# Patient Record
Sex: FEMALE | Race: WHITE | NOT HISPANIC OR LATINO | Employment: FULL TIME | URBAN - METROPOLITAN AREA
[De-identification: names, ages, dates, MRNs, and addresses within clinical notes are randomized per-mention and may not be internally consistent; named-entity substitution may affect disease eponyms.]

---

## 2023-04-13 DIAGNOSIS — J32.1 FRONTAL SINUSITIS, UNSPECIFIED CHRONICITY: Primary | ICD-10-CM

## 2023-04-13 RX ORDER — AZITHROMYCIN 250 MG/1
250 TABLET, FILM COATED ORAL DAILY
Qty: 6 TABLET | Refills: 0 | Status: SHIPPED | OUTPATIENT
Start: 2023-04-13 | End: 2023-04-18

## 2023-04-20 DIAGNOSIS — J40 BRONCHITIS: Primary | ICD-10-CM

## 2023-04-20 RX ORDER — METHYLPREDNISOLONE 4 MG/1
TABLET ORAL
Qty: 21 TABLET | Refills: 0 | Status: SHIPPED | OUTPATIENT
Start: 2023-04-20 | End: 2023-04-27

## 2023-07-10 ENCOUNTER — TELEMEDICINE (OUTPATIENT)
Dept: PRIMARY CARE | Facility: CLINIC | Age: 63
End: 2023-07-10
Payer: COMMERCIAL

## 2023-07-10 VITALS — SYSTOLIC BLOOD PRESSURE: 145 MMHG | DIASTOLIC BLOOD PRESSURE: 95 MMHG | WEIGHT: 143 LBS

## 2023-07-10 DIAGNOSIS — I10 HYPERTENSION, UNSPECIFIED TYPE: ICD-10-CM

## 2023-07-10 DIAGNOSIS — F41.9 ANXIETY: ICD-10-CM

## 2023-07-10 DIAGNOSIS — I10 HYPERTENSION, UNSPECIFIED TYPE: Primary | ICD-10-CM

## 2023-07-10 PROBLEM — N39.0 ACUTE UTI: Status: ACTIVE | Noted: 2023-07-10

## 2023-07-10 PROBLEM — B37.0 THRUSH: Status: ACTIVE | Noted: 2023-07-10

## 2023-07-10 PROBLEM — J40 BRONCHITIS: Status: ACTIVE | Noted: 2023-07-10

## 2023-07-10 PROCEDURE — 99214 OFFICE O/P EST MOD 30 MIN: CPT | Performed by: INTERNAL MEDICINE

## 2023-07-10 RX ORDER — METOPROLOL SUCCINATE 100 MG/1
100 TABLET, EXTENDED RELEASE ORAL DAILY
Qty: 30 TABLET | Refills: 11 | Status: SHIPPED | OUTPATIENT
Start: 2023-07-10 | End: 2023-07-10

## 2023-07-10 RX ORDER — METOPROLOL SUCCINATE 100 MG/1
100 TABLET, EXTENDED RELEASE ORAL DAILY
Qty: 30 TABLET | Refills: 11 | Status: SHIPPED | OUTPATIENT
Start: 2023-07-10

## 2023-07-10 RX ORDER — METOPROLOL SUCCINATE 100 MG/1
TABLET, EXTENDED RELEASE ORAL
Qty: 30 TABLET | Refills: 11 | Status: SHIPPED | OUTPATIENT
Start: 2023-07-10 | End: 2023-07-10 | Stop reason: SDUPTHER

## 2023-07-10 RX ORDER — METOPROLOL SUCCINATE 50 MG/1
50 TABLET, EXTENDED RELEASE ORAL DAILY
Qty: 30 TABLET | Refills: 11 | Status: SHIPPED | OUTPATIENT
Start: 2023-07-10 | End: 2023-08-01

## 2023-07-10 RX ORDER — CITALOPRAM 10 MG/1
10 TABLET ORAL DAILY
Qty: 30 TABLET | Refills: 5 | Status: SHIPPED | OUTPATIENT
Start: 2023-07-10 | End: 2023-08-01

## 2023-07-10 RX ORDER — METOPROLOL SUCCINATE 50 MG/1
50 TABLET, EXTENDED RELEASE ORAL DAILY
Qty: 30 TABLET | Refills: 5 | Status: SHIPPED | OUTPATIENT
Start: 2023-07-10 | End: 2023-07-10 | Stop reason: SDUPTHER

## 2023-07-10 RX ORDER — HYDROXYZINE HYDROCHLORIDE 25 MG/1
25 TABLET, FILM COATED ORAL DAILY PRN
Qty: 30 TABLET | Refills: 3 | Status: SHIPPED | OUTPATIENT
Start: 2023-07-10 | End: 2023-08-04

## 2023-07-10 ASSESSMENT — PATIENT HEALTH QUESTIONNAIRE - PHQ9
10. IF YOU CHECKED OFF ANY PROBLEMS, HOW DIFFICULT HAVE THESE PROBLEMS MADE IT FOR YOU TO DO YOUR WORK, TAKE CARE OF THINGS AT HOME, OR GET ALONG WITH OTHER PEOPLE: SOMEWHAT DIFFICULT
SUM OF ALL RESPONSES TO PHQ9 QUESTIONS 1 AND 2: 1
1. LITTLE INTEREST OR PLEASURE IN DOING THINGS: NOT AT ALL
2. FEELING DOWN, DEPRESSED OR HOPELESS: SEVERAL DAYS

## 2023-07-10 NOTE — PROGRESS NOTES
"Subjective   Patient ID: Laura Logan is a 63 y.o. female who presents for Follow-up (Discuss medications and anxiety).  HPI  Anxiety worse since covid  Does not want to go places   Does not like to drive  Works at the school  Some palpitations  Saw dr smith prev  Sees dr beaver for vertigo  Does not like take meds  Has not tried celexa  Had stress test, echo EKG w cardiology w dr smith  All nl per pt  Anxiety makes her feel down  No si  Pulse 70-80  Wont get jeromy bc afraid it will be abnl      Review of Systems  Gen:  no fever  HEENT:  no trouble swallowing  CV:  no dyspnea, cyanosis  Lungs:  no shortness of breath  GI:  no constipation, no blood in stool  Vascular:  no edema  Neuro:   no weakness  Skin:  no rash  MS:no joint swelling  Gu:  no urinary complaints  All other systems have been reviewed and are negative for complaint    BP (!) 145/95   Wt 64.9 kg (143 lb)   Objective   Physical Exam  No results found for: \"WBC\", \"HGB\", \"HCT\", \"MCV\", \"PLT\"  No results found for: \"GLUCOSE\", \"CALCIUM\", \"NA\", \"K\", \"CO2\", \"CL\", \"BUN\", \"CREATININE\"  Social History     Socioeconomic History    Marital status: Single     Spouse name: None    Number of children: None    Years of education: None    Highest education level: None   Occupational History    None   Tobacco Use    Smoking status: Never    Smokeless tobacco: Never   Substance and Sexual Activity    Alcohol use: Yes    Drug use: None    Sexual activity: None   Other Topics Concern    None   Social History Narrative    None     Social Determinants of Health     Financial Resource Strain: Not on file   Food Insecurity: Not on file   Transportation Needs: Not on file   Physical Activity: Not on file   Stress: Not on file   Social Connections: Not on file   Intimate Partner Violence: Not on file   Housing Stability: Not on file     No family history on file.      General Appearance:  Alert and oriented.  NAD  Lungs, CTAB  Skin:  no suspicious lesions,  warm and dry  Head :  " Normocephalic  Neck/thyroid:  neck supple, full rom, no cervical lymphadenopathy  no thyromegaly  Heart:  RRR  no murmurs  Abdomen:  Normal , bs present, soft, nontender, not distended, no masses palpated  Extremities:  No clubbing, cyanosis, or edema  Neurologic:  Nonfocal  Psych: alert, normal mood    Problem List Items Addressed This Visit    None  Visit Diagnoses       Hypertension, unspecified type    -  Primary    Relevant Medications    metoprolol succinate XL (Toprol-XL) 50 mg 24 hr tablet    Toprol  mg 24 hr tablet    Other Relevant Orders    Comprehensive Metabolic Panel    Hemoglobin A1C    TSH with reflex to Free T4 if abnormal    Vitamin D, Total    Vitamin B12    Lipid Panel    CBC and Auto Differential    Magnesium    Iron and TIBC    Ferritin    Anxiety        Relevant Medications    metoprolol succinate XL (Toprol-XL) 50 mg 24 hr tablet    hydrOXYzine HCL (Atarax) 25 mg tablet    citalopram (CeleXA) 10 mg tablet    Other Relevant Orders    Comprehensive Metabolic Panel    Hemoglobin A1C    TSH with reflex to Free T4 if abnormal    Vitamin D, Total    Vitamin B12    Lipid Panel    CBC and Auto Differential    Magnesium    Iron and TIBC    Ferritin            Laura was seen today for follow-up.  Diagnoses and all orders for this visit:  Hypertension, unspecified type (Primary)  -     Comprehensive Metabolic Panel; Future  -     Hemoglobin A1C; Future  -     TSH with reflex to Free T4 if abnormal; Future  -     Vitamin D, Total; Future  -     Vitamin B12; Future  -     Lipid Panel; Future  -     CBC and Auto Differential; Future  -     Magnesium; Future  -     Iron and TIBC; Future  -     Ferritin; Future  -     metoprolol succinate XL (Toprol-XL) 50 mg 24 hr tablet; Take 1 tablet (50 mg) by mouth once daily. Do not crush or chew.  -     Toprol  mg 24 hr tablet; Take 1 tablet (100 mg) by mouth once daily.  Anxiety  -     Comprehensive Metabolic Panel; Future  -     Hemoglobin A1C; Future  -      TSH with reflex to Free T4 if abnormal; Future  -     Vitamin D, Total; Future  -     Vitamin B12; Future  -     Lipid Panel; Future  -     CBC and Auto Differential; Future  -     Magnesium; Future  -     Iron and TIBC; Future  -     Ferritin; Future  -     metoprolol succinate XL (Toprol-XL) 50 mg 24 hr tablet; Take 1 tablet (50 mg) by mouth once daily. Do not crush or chew.  -     hydrOXYzine HCL (Atarax) 25 mg tablet; Take 1 tablet (25 mg) by mouth once daily as needed for itching.  -     citalopram (CeleXA) 10 mg tablet; Take 1 tablet (10 mg) by mouth once daily.  Discussed EKG, would like to have  Pt may stop in on nurse sched to do   An interactive audio and video telecommunication system which permits real time communications between the patient (at the originating site) and provider (at the distant site) was utilized to provide this telehealth service.  Verbal consent was requested and obtained from the patient for this telehealth visit.

## 2023-07-12 DIAGNOSIS — M25.559 HIP PAIN, UNSPECIFIED LATERALITY: Primary | ICD-10-CM

## 2023-07-12 RX ORDER — METHYLPREDNISOLONE 4 MG/1
TABLET ORAL
Qty: 21 TABLET | Refills: 0 | Status: SHIPPED | OUTPATIENT
Start: 2023-07-12 | End: 2023-07-19

## 2023-07-19 ENCOUNTER — TELEPHONE (OUTPATIENT)
Dept: PRIMARY CARE | Facility: CLINIC | Age: 63
End: 2023-07-19
Payer: COMMERCIAL

## 2023-07-19 NOTE — TELEPHONE ENCOUNTER
PA was denied for Toprol XL.  Adverse reaction determination fax was given to pcp for recommendation.

## 2023-07-25 NOTE — TELEPHONE ENCOUNTER
Spoke with patient  She pays with [facility] and does not need the PA  She does not want to change to generic at this time

## 2023-08-01 DIAGNOSIS — I10 HYPERTENSION, UNSPECIFIED TYPE: ICD-10-CM

## 2023-08-01 DIAGNOSIS — F41.9 ANXIETY: ICD-10-CM

## 2023-08-01 RX ORDER — CITALOPRAM 10 MG/1
10 TABLET ORAL DAILY
Qty: 30 TABLET | Refills: 5 | Status: SHIPPED | OUTPATIENT
Start: 2023-08-01 | End: 2023-12-01

## 2023-08-01 RX ORDER — METOPROLOL SUCCINATE 50 MG/1
50 TABLET, EXTENDED RELEASE ORAL DAILY
Qty: 30 TABLET | Refills: 11 | Status: SHIPPED | OUTPATIENT
Start: 2023-08-01 | End: 2023-09-08 | Stop reason: SDUPTHER

## 2023-08-03 DIAGNOSIS — F41.9 ANXIETY: ICD-10-CM

## 2023-08-04 RX ORDER — HYDROXYZINE HYDROCHLORIDE 25 MG/1
TABLET, FILM COATED ORAL
Qty: 30 TABLET | Refills: 3 | Status: SHIPPED | OUTPATIENT
Start: 2023-08-04 | End: 2023-08-18

## 2023-08-17 DIAGNOSIS — F41.9 ANXIETY: ICD-10-CM

## 2023-08-18 RX ORDER — HYDROXYZINE HYDROCHLORIDE 25 MG/1
TABLET, FILM COATED ORAL
Qty: 180 TABLET | Refills: 0 | Status: SHIPPED | OUTPATIENT
Start: 2023-08-18

## 2023-09-08 DIAGNOSIS — I10 HYPERTENSION, UNSPECIFIED TYPE: ICD-10-CM

## 2023-09-08 DIAGNOSIS — F41.9 ANXIETY: ICD-10-CM

## 2023-09-08 RX ORDER — METOPROLOL SUCCINATE 25 MG/1
TABLET, EXTENDED RELEASE ORAL
Qty: 90 TABLET | Refills: 3 | OUTPATIENT
Start: 2023-09-08

## 2023-09-08 RX ORDER — METOPROLOL SUCCINATE 50 MG/1
50 TABLET, EXTENDED RELEASE ORAL DAILY
Qty: 30 TABLET | Refills: 11 | Status: SHIPPED | OUTPATIENT
Start: 2023-09-08

## 2023-09-22 ENCOUNTER — APPOINTMENT (OUTPATIENT)
Dept: PRIMARY CARE | Facility: CLINIC | Age: 63
End: 2023-09-22
Payer: COMMERCIAL

## 2023-12-01 DIAGNOSIS — F41.9 ANXIETY: ICD-10-CM

## 2023-12-01 RX ORDER — CITALOPRAM 10 MG/1
10 TABLET ORAL DAILY
Qty: 90 TABLET | Refills: 1 | Status: SHIPPED | OUTPATIENT
Start: 2023-12-01

## 2023-12-19 ENCOUNTER — OFFICE VISIT (OUTPATIENT)
Dept: GASTROENTEROLOGY | Facility: CLINIC | Age: 63
End: 2023-12-19
Payer: COMMERCIAL

## 2023-12-19 VITALS
DIASTOLIC BLOOD PRESSURE: 92 MMHG | WEIGHT: 156.4 LBS | HEIGHT: 64 IN | HEART RATE: 57 BPM | SYSTOLIC BLOOD PRESSURE: 148 MMHG | BODY MASS INDEX: 26.7 KG/M2

## 2023-12-19 DIAGNOSIS — Z12.11 SCREEN FOR COLON CANCER: ICD-10-CM

## 2023-12-19 DIAGNOSIS — K62.5 RECTAL BLEEDING: Primary | ICD-10-CM

## 2023-12-19 PROCEDURE — 3080F DIAST BP >= 90 MM HG: CPT | Performed by: NURSE PRACTITIONER

## 2023-12-19 PROCEDURE — 1036F TOBACCO NON-USER: CPT | Performed by: NURSE PRACTITIONER

## 2023-12-19 PROCEDURE — 99203 OFFICE O/P NEW LOW 30 MIN: CPT | Performed by: NURSE PRACTITIONER

## 2023-12-19 PROCEDURE — 3077F SYST BP >= 140 MM HG: CPT | Performed by: NURSE PRACTITIONER

## 2023-12-19 RX ORDER — HYDROCORTISONE ACETATE 25 MG/1
25 SUPPOSITORY RECTAL 2 TIMES DAILY
Qty: 25 SUPPOSITORY | Refills: 0 | Status: SHIPPED | OUTPATIENT
Start: 2023-12-19 | End: 2024-01-18

## 2023-12-19 NOTE — PROGRESS NOTES
Assessment/Plan   Laura Logan is a 63 y.o. female who presents to GI clinic for rectal bleeding after diarrheal illness x24 hours. Reports h/o anal fissure after child birth and hemorrhoids. Never had colonoscopy.     Recommend colonoscopy to be done at [facility] patient request  Sutab prep, sample provided to the patient during today's visit  Patient informed she will need a  the day of the procedure  Please have blood work drawn ordered by your PCP  Follow-up with GI 2 weeks after colonoscopy to review results  Patient was instructed to go to the emergency room if she develops abdominal pain, fever or chills, or significant rectal bleeding.    INTERNAL HEMORRHOIDS   Recommendations:   1. Sitz bath's with lukewarm water for 15-20 minutes twice daily.    2. Then apply Tucks pads or witch hazel to anal area for 2 minutes twice daily or after BM.   3. Insert hydrocortisone suppository lubricated with RectiCare cream or equivalent twice daily x5-10 days.    Leighann Ferro, APRN-CNP      Subjective     History of Present Illness:   Laura Logan is a 63 y.o. female who presents to GI clinic for rectal bleeding. Patient was sick with the flu, developed acute diarrhea Sunday night-Monday. Noticed blood when wiping. Diarrhea seems to have subsided after 24-48 hours. Now with rectal irritation, slight blood with wiping after BM. Denies unintentional weight loss, abdominal pain, N/V/C. Never had colonoscopy. No family history of CRC or IBD. Not on blood thinners.     Review of Systems  ROS Negative unless otherwise stated above.    Past Medical History  HTN, anxiety    Social History   reports that she has never smoked. She has never used smokeless tobacco. She reports current alcohol use.     Family History  family history includes Heart disease in her mother; Lymphoma in her father.  No family history of CRC.    Allergies  Allergies   Allergen Reactions    Codeine Unknown    Penicillins Unknown    Propoxyphene Unknown     Sulfa (Sulfonamide Antibiotics) Unknown       Medications  Current Outpatient Medications   Medication Instructions    aspirin 81 mg, oral, Daily    citalopram (CELEXA) 10 mg, oral, Daily    hydrOXYzine HCL (Atarax) 25 mg tablet TAKE 1 TABLET BY MOUTH TWICE A DAY AS NEEDED FOR ANXIETY    metoprolol succinate XL (TOPROL XL) 50 mg, oral, Daily, DO NOT CRUSH OR CHEW    multivit-min-ferrous fumarate 15 mg iron tablet 1 tablet, oral, Daily    Toprol  mg, oral, Daily, Do not crush or chew.        Objective   Visit Vitals  BP (!) 178/109   Pulse 84      General: A&Ox3, NAD.  HEENT: AT/NC.   CV: RRR. No murmur.  Resp: CTA bilaterally. No wheezing, rhonchi or rales.   GI: Soft, NT/ND. BSx4.  Extrem: No edema. Pulses intact.  Skin: No Jaundice.   Neuro: No focal deficits.   Psych: Normal mood and affect.

## 2023-12-29 ENCOUNTER — APPOINTMENT (OUTPATIENT)
Dept: PRIMARY CARE | Facility: CLINIC | Age: 63
End: 2023-12-29
Payer: COMMERCIAL

## 2024-01-02 ENCOUNTER — TELEPHONE (OUTPATIENT)
Dept: PRIMARY CARE | Facility: CLINIC | Age: 64
End: 2024-01-02
Payer: COMMERCIAL

## 2024-01-02 NOTE — TELEPHONE ENCOUNTER
Bp running around 118/72 P 62 which is low for her, normally is 140/89.  This is in the morning before she takes any meds.  In the summer you increased to her Toprol XL to 150mg did you want her to go back to 125mg or just keep tracking and bring with her to appt in Feb 2?  Please advise

## 2024-02-02 ENCOUNTER — APPOINTMENT (OUTPATIENT)
Dept: PRIMARY CARE | Facility: CLINIC | Age: 64
End: 2024-02-02
Payer: COMMERCIAL

## 2024-02-12 ENCOUNTER — APPOINTMENT (OUTPATIENT)
Dept: GASTROENTEROLOGY | Facility: CLINIC | Age: 64
End: 2024-02-12
Payer: COMMERCIAL

## 2024-07-04 ENCOUNTER — LAB REQUISITION (OUTPATIENT)
Dept: LAB | Facility: HOSPITAL | Age: 64
End: 2024-07-04
Payer: COMMERCIAL

## 2024-07-04 DIAGNOSIS — N39.0 URINARY TRACT INFECTION, SITE NOT SPECIFIED: ICD-10-CM

## 2024-07-04 PROCEDURE — 87086 URINE CULTURE/COLONY COUNT: CPT

## 2024-07-06 LAB — BACTERIA UR CULT: NO GROWTH

## 2024-08-14 ENCOUNTER — LAB REQUISITION (OUTPATIENT)
Dept: LAB | Facility: HOSPITAL | Age: 64
End: 2024-08-14
Payer: COMMERCIAL

## 2024-08-14 DIAGNOSIS — N39.0 URINARY TRACT INFECTION, SITE NOT SPECIFIED: ICD-10-CM

## 2024-08-14 PROCEDURE — 87086 URINE CULTURE/COLONY COUNT: CPT

## 2024-08-15 LAB — BACTERIA UR CULT: NORMAL

## 2024-10-20 DIAGNOSIS — I10 HYPERTENSION, UNSPECIFIED TYPE: ICD-10-CM

## 2024-10-21 RX ORDER — METOPROLOL SUCCINATE 100 MG/1
100 TABLET, EXTENDED RELEASE ORAL DAILY
Qty: 90 TABLET | OUTPATIENT
Start: 2024-10-21

## 2025-04-07 ENCOUNTER — OFFICE VISIT (OUTPATIENT)
Dept: URGENT CARE | Age: 65
End: 2025-04-07
Payer: COMMERCIAL

## 2025-04-07 VITALS
HEIGHT: 63 IN | WEIGHT: 139 LBS | TEMPERATURE: 98 F | OXYGEN SATURATION: 97 % | RESPIRATION RATE: 17 BRPM | DIASTOLIC BLOOD PRESSURE: 99 MMHG | BODY MASS INDEX: 24.63 KG/M2 | SYSTOLIC BLOOD PRESSURE: 190 MMHG | HEART RATE: 81 BPM

## 2025-04-07 DIAGNOSIS — J02.9 SORE THROAT: ICD-10-CM

## 2025-04-07 LAB
POC CORONAVIRUS SARS-COV-2 PCR: NEGATIVE
POC HUMAN RHINOVIRUS PCR: POSITIVE
POC INFLUENZA A VIRUS PCR: NEGATIVE
POC INFLUENZA B VIRUS PCR: NEGATIVE
POC RAPID STREP: NEGATIVE
POC RESPIRATORY SYNCYTIAL VIRUS PCR: NEGATIVE

## 2025-04-07 PROCEDURE — 3008F BODY MASS INDEX DOCD: CPT

## 2025-04-07 PROCEDURE — 3077F SYST BP >= 140 MM HG: CPT

## 2025-04-07 PROCEDURE — 99213 OFFICE O/P EST LOW 20 MIN: CPT

## 2025-04-07 PROCEDURE — 3080F DIAST BP >= 90 MM HG: CPT

## 2025-04-07 PROCEDURE — 87631 RESP VIRUS 3-5 TARGETS: CPT

## 2025-04-07 PROCEDURE — 87880 STREP A ASSAY W/OPTIC: CPT

## 2025-04-07 PROCEDURE — 1036F TOBACCO NON-USER: CPT

## 2025-04-07 ASSESSMENT — PATIENT HEALTH QUESTIONNAIRE - PHQ9
1. LITTLE INTEREST OR PLEASURE IN DOING THINGS: NOT AT ALL
2. FEELING DOWN, DEPRESSED OR HOPELESS: NOT AT ALL
SUM OF ALL RESPONSES TO PHQ9 QUESTIONS 1 AND 2: 0

## 2025-04-07 ASSESSMENT — ENCOUNTER SYMPTOMS: SORE THROAT: 1

## 2025-04-07 NOTE — PROGRESS NOTES
Subjective   Patient ID: Laura Logan is a 64 y.o. female. They present today with a chief complaint of Sore Throat (No other sx, has been around kids with strep ).    History of Present Illness  Subjective  Laura Logan is a 64 y.o. female who presents for evaluation of sore throat. Associated symptoms include coryza, pain while swallowing, post nasal drip, sinus and nasal congestion, and sore throat. Onset of symptoms was 1 week ago, and have been unchanged since that time. She is drinking plenty of fluids. She has had a recent close exposure to someone with proven streptococcal pharyngitis. She has a history of hypertension that is reportedly well-managed and she takes her blood pressure regularly at home with consistently normal values. She has experienced previous episodes of high blood pressure in acute care settings.     Review of Systems   Constitutional:  Endorses malaise. Denies fever, chills, fatigue  Eye:  Denies, visual changes, blurred vision.    ENT: Denies ear pain, ear discharge, sinus pain, sore throat  Respiratory:  Denies shortness of breath, cough, wheezing, sputum production, orthopnea, hemoptysis  Cardiovascular:  Denies chest pain, palpitations, lightheadedness, dizziness, syncope.    Gastrointestinal:  Denies nausea, vomiting  Musculoskeletal:  Denies lower extremity swelling  Neurologic:  Denies numbness, weakness, paresthesia, headache   All other systems are negative          History provided by:  Patient   used: No    Sore Throat         Past Medical History  Allergies as of 04/07/2025 - Reviewed 04/07/2025   Allergen Reaction Noted    Codeine Unknown 05/02/2023    Penicillins Unknown 05/02/2023    Propoxyphene Unknown 05/02/2023    Sulfa (sulfonamide antibiotics) Unknown 05/02/2023       (Not in a hospital admission)       Past Medical History:   Diagnosis Date    Other specified health status     No pertinent past medical history       Past Surgical History:  "  Procedure Laterality Date    OTHER SURGICAL HISTORY  06/16/2020    Oral surgery    OTHER SURGICAL HISTORY  06/16/2020    Lumpectomy    OTHER SURGICAL HISTORY  06/16/2020    Back surgery        reports that she has never smoked. She has never been exposed to tobacco smoke. She has never used smokeless tobacco. She reports current alcohol use.    Review of Systems  Review of Systems   HENT:  Positive for sore throat.                                   Objective    Vitals:    04/07/25 1215   BP: (!) 190/99   Pulse: 81   Resp: 17   Temp: 36.7 °C (98 °F)   SpO2: 97%   Weight: 63 kg (139 lb)   Height: 1.6 m (5' 3\")     No LMP recorded. Patient is postmenopausal.    Physical Exam  Vitals and nursing note reviewed.   Constitutional:       General: She is not in acute distress.     Appearance: Normal appearance. She is normal weight. She is ill-appearing. She is not toxic-appearing or diaphoretic.   HENT:      Right Ear: Tympanic membrane, ear canal and external ear normal. There is no impacted cerumen. Tympanic membrane is not injected, perforated, erythematous or bulging.      Left Ear: Tympanic membrane, ear canal and external ear normal. There is no impacted cerumen. Tympanic membrane is not injected, perforated, erythematous or bulging.      Nose: Congestion and rhinorrhea present.      Right Turbinates: Enlarged and swollen.      Left Turbinates: Enlarged and swollen.      Mouth/Throat:      Mouth: Mucous membranes are moist.      Pharynx: Oropharynx is clear. Postnasal drip present. No pharyngeal swelling, oropharyngeal exudate, posterior oropharyngeal erythema or uvula swelling.      Tonsils: No tonsillar exudate or tonsillar abscesses.   Eyes:      General: No scleral icterus.        Right eye: No discharge.         Left eye: No discharge.      Conjunctiva/sclera: Conjunctivae normal.   Cardiovascular:      Rate and Rhythm: Normal rate and regular rhythm.      Pulses: Normal pulses.      Heart sounds: Normal heart " sounds. No murmur heard.     No friction rub. No gallop.   Pulmonary:      Effort: Pulmonary effort is normal. No respiratory distress.      Breath sounds: Normal breath sounds. No stridor. No wheezing, rhonchi or rales.   Musculoskeletal:      Cervical back: Normal range of motion and neck supple. No rigidity or tenderness.   Lymphadenopathy:      Cervical: No cervical adenopathy.   Skin:     General: Skin is warm and dry.      Coloration: Skin is not jaundiced or pale.      Findings: No bruising, erythema or rash.   Neurological:      General: No focal deficit present.      Mental Status: She is alert and oriented to person, place, and time.       Procedures    Point of Care Test & Imaging Results from this visit  No results found for this visit on 04/07/25.   Imaging  No results found.    Cardiology, Vascular, and Other Imaging  No other imaging results found for the past 2 days      Diagnostic study results (if any) were reviewed by ALEXUS Wiggins.    Assessment/Plan   Allergies, medications, history, and pertinent labs/EKGs/Imaging reviewed by ALEXUS Wiggins.     Medical Decision Making    Patient's history and exam consistent with?viral syndrome.? Tested positive for rhinovirus. No indications for antibiotics at this time.?  Rapid strep is negative and viral Spotfire test positive for rhinovirus.? No?evidence of?strep,?pneumonia, otitis, bacterial sinusitis, other bacterial etiology, sepsis, heart failure, ACS, hypertensive urgency or emergency.? A second manual blood pressure with similar value. She messaged her PCP regarding her blood pressure and has follow-up. Encouraged patient?to continue supportive care measures and symptom management.? Advised to follow-up with primary care provider if?symptoms?persist, or?return with any new or worsening symptoms. Patient agreeable with plan and verbalized understanding.???       Orders and Diagnoses  There are no diagnoses linked to this  encounter.    Medical Admin Record      Patient disposition: Home    Electronically signed by ALEXUS Wiggins  12:39 PM

## 2025-05-13 DIAGNOSIS — Z12.31 ENCOUNTER FOR SCREENING MAMMOGRAM FOR BREAST CANCER: ICD-10-CM
